# Patient Record
(demographics unavailable — no encounter records)

---

## 2024-11-26 NOTE — HISTORY OF PRESENT ILLNESS
[Home] : at home, [unfilled] , at the time of the visit. [Other Location: e.g. Home (Enter Location, City,State)___] : at [unfilled] [Verbal consent obtained from patient] : the patient, [unfilled] [FreeTextEntry8] : Wife: Jane 71 yo male recently hospitalized and has a sacral ulcer and now with pain. Was admitted to [facility] for spinal surgery, discharged this afternoon. VNS coming tomorrow. F/U appt on thursday with surgical team. Requesting instructions for sacral ulcer care.  Dr. Johnathan Murry for cervical spinal fusion

## 2024-11-26 NOTE — PLAN
[FreeTextEntry1] : Spent an extended time discussing sacral ulcer care and red flag symptoms -maintain upcoming appointment with surgical team -VNS to monitor

## 2024-11-26 NOTE — HISTORY OF PRESENT ILLNESS
[Home] : at home, [unfilled] , at the time of the visit. [Other Location: e.g. Home (Enter Location, City,State)___] : at [unfilled] [Verbal consent obtained from patient] : the patient, [unfilled] [FreeTextEntry8] : Wife: Jane 71 yo male recently hospitalized and has a sacral ulcer and now with pain. Was admitted to [facility] for spinal surgery, discharged this afternoon. VNS coming tomorrow. F/U appt on thursday with surgical team. Requesting instructions for sacral ulcer care.  Dr. Johnathan Murry for cervical spinal fusion